# Patient Record
Sex: MALE | Race: OTHER
[De-identification: names, ages, dates, MRNs, and addresses within clinical notes are randomized per-mention and may not be internally consistent; named-entity substitution may affect disease eponyms.]

---

## 2020-06-16 ENCOUNTER — HOSPITAL ENCOUNTER (INPATIENT)
Dept: HOSPITAL 72 - SDSOVERFLO | Age: 60
LOS: 2 days | Discharge: HOME HEALTH | DRG: 470 | End: 2020-06-18
Payer: COMMERCIAL

## 2020-06-16 VITALS — DIASTOLIC BLOOD PRESSURE: 77 MMHG | SYSTOLIC BLOOD PRESSURE: 127 MMHG

## 2020-06-16 VITALS — DIASTOLIC BLOOD PRESSURE: 79 MMHG | SYSTOLIC BLOOD PRESSURE: 124 MMHG

## 2020-06-16 VITALS — DIASTOLIC BLOOD PRESSURE: 70 MMHG | SYSTOLIC BLOOD PRESSURE: 124 MMHG

## 2020-06-16 VITALS — DIASTOLIC BLOOD PRESSURE: 66 MMHG | SYSTOLIC BLOOD PRESSURE: 111 MMHG

## 2020-06-16 VITALS — DIASTOLIC BLOOD PRESSURE: 88 MMHG | SYSTOLIC BLOOD PRESSURE: 131 MMHG

## 2020-06-16 VITALS — SYSTOLIC BLOOD PRESSURE: 148 MMHG | DIASTOLIC BLOOD PRESSURE: 75 MMHG

## 2020-06-16 VITALS — BODY MASS INDEX: 31.76 KG/M2 | HEIGHT: 64 IN | WEIGHT: 186 LBS

## 2020-06-16 VITALS — SYSTOLIC BLOOD PRESSURE: 117 MMHG | DIASTOLIC BLOOD PRESSURE: 71 MMHG

## 2020-06-16 VITALS — DIASTOLIC BLOOD PRESSURE: 75 MMHG | SYSTOLIC BLOOD PRESSURE: 131 MMHG

## 2020-06-16 VITALS — DIASTOLIC BLOOD PRESSURE: 74 MMHG | SYSTOLIC BLOOD PRESSURE: 123 MMHG

## 2020-06-16 VITALS — SYSTOLIC BLOOD PRESSURE: 134 MMHG | DIASTOLIC BLOOD PRESSURE: 88 MMHG

## 2020-06-16 VITALS — SYSTOLIC BLOOD PRESSURE: 127 MMHG | DIASTOLIC BLOOD PRESSURE: 70 MMHG

## 2020-06-16 VITALS — SYSTOLIC BLOOD PRESSURE: 127 MMHG | DIASTOLIC BLOOD PRESSURE: 76 MMHG

## 2020-06-16 VITALS — DIASTOLIC BLOOD PRESSURE: 70 MMHG | SYSTOLIC BLOOD PRESSURE: 135 MMHG

## 2020-06-16 VITALS — SYSTOLIC BLOOD PRESSURE: 127 MMHG | DIASTOLIC BLOOD PRESSURE: 79 MMHG

## 2020-06-16 DIAGNOSIS — I10: ICD-10-CM

## 2020-06-16 DIAGNOSIS — Z87.891: ICD-10-CM

## 2020-06-16 DIAGNOSIS — Z79.82: ICD-10-CM

## 2020-06-16 DIAGNOSIS — E78.5: ICD-10-CM

## 2020-06-16 DIAGNOSIS — Z79.84: ICD-10-CM

## 2020-06-16 DIAGNOSIS — Z88.0: ICD-10-CM

## 2020-06-16 DIAGNOSIS — E11.9: ICD-10-CM

## 2020-06-16 DIAGNOSIS — G62.9: ICD-10-CM

## 2020-06-16 DIAGNOSIS — M17.12: Primary | ICD-10-CM

## 2020-06-16 PROCEDURE — 86850 RBC ANTIBODY SCREEN: CPT

## 2020-06-16 PROCEDURE — 94003 VENT MGMT INPAT SUBQ DAY: CPT

## 2020-06-16 PROCEDURE — 87081 CULTURE SCREEN ONLY: CPT

## 2020-06-16 PROCEDURE — 86901 BLOOD TYPING SEROLOGIC RH(D): CPT

## 2020-06-16 PROCEDURE — 94150 VITAL CAPACITY TEST: CPT

## 2020-06-16 PROCEDURE — 86900 BLOOD TYPING SEROLOGIC ABO: CPT

## 2020-06-16 PROCEDURE — 85025 COMPLETE CBC W/AUTO DIFF WBC: CPT

## 2020-06-16 PROCEDURE — 0YPB0YZ REMOVAL OF OTHER DEVICE FROM LEFT LOWER EXTREMITY, OPEN APPROACH: ICD-10-PCS

## 2020-06-16 PROCEDURE — 36415 COLL VENOUS BLD VENIPUNCTURE: CPT

## 2020-06-16 PROCEDURE — 0SRD0J9 REPLACEMENT OF LEFT KNEE JOINT WITH SYNTHETIC SUBSTITUTE, CEMENTED, OPEN APPROACH: ICD-10-PCS

## 2020-06-16 PROCEDURE — 76000 FLUOROSCOPY <1 HR PHYS/QHP: CPT

## 2020-06-16 RX ADMIN — HYDROCODONE BITARTRATE AND ACETAMINOPHEN PRN TAB: 5; 325 TABLET ORAL at 17:31

## 2020-06-16 RX ADMIN — HYDROCODONE BITARTRATE AND ACETAMINOPHEN PRN TAB: 5; 325 TABLET ORAL at 23:51

## 2020-06-16 RX ADMIN — DOCUSATE SODIUM SCH MG: 100 CAPSULE, LIQUID FILLED ORAL at 13:00

## 2020-06-16 RX ADMIN — CLINDAMYCIN PHOSPHATE SCH MLS/HR: 12 INJECTION, SOLUTION INTRAVENOUS at 14:03

## 2020-06-16 RX ADMIN — DOCUSATE SODIUM SCH MG: 100 CAPSULE, LIQUID FILLED ORAL at 17:07

## 2020-06-16 RX ADMIN — OXYCODONE HYDROCHLORIDE SCH MG: 10 TABLET, FILM COATED, EXTENDED RELEASE ORAL at 20:10

## 2020-06-16 RX ADMIN — CELECOXIB SCH MG: 200 CAPSULE ORAL at 09:00

## 2020-06-16 RX ADMIN — DOCUSATE SODIUM SCH MG: 100 CAPSULE, LIQUID FILLED ORAL at 14:03

## 2020-06-16 RX ADMIN — OXYCODONE HYDROCHLORIDE SCH MG: 10 TABLET, FILM COATED, EXTENDED RELEASE ORAL at 09:00

## 2020-06-16 RX ADMIN — CLINDAMYCIN PHOSPHATE SCH MLS/HR: 12 INJECTION, SOLUTION INTRAVENOUS at 20:09

## 2020-06-16 RX ADMIN — DEXTROSE, SODIUM CHLORIDE, AND POTASSIUM CHLORIDE SCH MLS/HR: 5; .45; .15 INJECTION INTRAVENOUS at 12:43

## 2020-06-16 NOTE — 48 HOUR POST ANESTHESIA EVAL
Post Anesthesia Evaluation


Procedure:   L Knee Total Arthroplasty


Date of Evaluation:  Jun 16, 2020


Time of Evaluation:  12:13


Blood Pressure Systolic:  128


0:  75


Pulse Rate:  62


Respiratory Rate:  18


Temperature (Fahrenheit):  98.4


O2 Sat by Pulse Oximetry:  99


Airway:  patent


Nausea:  No


Vomiting:  No


Pain Intensity:  2


Hydration Status:  adequate


Cardiopulmonary Status:


Stable


Mental Status/LOC:  patient returned to baseline


Follow-up Care/Observations:


0


Post-Anesthesia Complications:


0


Follow-up care needed:  ready to discharge











Thien Tyler MD Jun 16, 2020 08:05

## 2020-06-16 NOTE — PRE-PROCEDURE NOTE/ATTESTATION
Pre-Procedure Note/Attestation


Complete Prior to Procedure


Planned Procedure:  left


Procedure Narrative:


left knee hardware removal and TKA





Indications for Procedure


Pre-Operative Diagnosis:


left knee arthritis





Attestation


I attest that I discussed the nature of the procedure; its benefits; risks and 

complications; and alternatives (and the risks and benefits of such alternatives

), prior to the procedure, with the patient (or the patient's legal 

representative).





I attest that, if there was a reasonable possibility of needing a blood 

transfusion, the patient (or the patient's legal representative) was given the 

[facility] standardized written summary, pursuant 

to the Matt Kimi Blood Safety Act ([place] Health and Safety Code # 1645, as 

amended).





I attest that I re-evaluated the patient just prior to the surgery and that 

there has been no change in the patient's H&P, except as documented below: NONE











Rahat Wilson MD Jun 16, 2020 06:58

## 2020-06-16 NOTE — DIAGNOSTIC IMAGING REPORT
EXAM: X-RAY XRAY Knee 1-2v L

 

CLINICAL HISTORY: Knee pain. Status post knee replacement.

 

COMPARISON:  None

 

FINDINGS:  

 

2 views are obtained demonstrating postoperative left knee replacement. Total knee

prosthesis is in place in good alignment. Old fracture deformity of the proximal

tibia noted. There are ghost tracks identified in the distal femur. No acute bony

abnormality seen. Small amounts of subcutaneous emphysema noted.

 

 

IMPRESSION:

 

Post operative left knee replacement in anatomic alignment.

## 2020-06-16 NOTE — DIAGNOSTIC IMAGING REPORT
XRAY Knee 1-2v L

 

CLINICAL HISTORY: Knee pain. Internal fixation hardware.

 

COMPARISON:  None

 

FINDINGS:  

 

Fluoroscopy independent procedure performed for localization for removal of screws.

6.3 seconds of fluoroscopy time utilized by the ordering physician.  Total cumulative

dose is 0.32 mGy and 0.21814 Gy.cm2.  Total of 3 spot images are obtained .

 

 

IMPRESSION:

 

FLUOROSCOPY GUIDED PROCEDURE.

## 2020-06-16 NOTE — BRIEF OPERATIVE NOTE
Immediate Post Operative Note


Operative Note


Chief Complaint:  left knee arthritis


Pre-op Diagnosis:


left knee arthritis


Procedure:


left knee screw removal and TKA


Post-op Diagnosis:  same as pre-op


Findings:  consistent w/pre-op dx studies


Surgeon:  md sebastien


Assistant:  sejal forde


Anesthesiologist:  md aba


Anesthesia:  general


Specimen:  yes


Complications:  none


Condition:  stable


Fluids:  ns


Estimated Blood Loss:  minimal


Drains:  none


Implant(s) used?:  Yes - Jacquelyn Martinez Jun 16, 2020 09:40

## 2020-06-16 NOTE — ANETHESIA PREOPERATIVE EVAL
Anesthesia Pre-op PMH/ROS


General


Date of Evaluation:  2020


Time of Evaluation:  06:49


Anesthesiologist:  Nayeli


ASA Score:  ASA 3


Mallampati Score


Class I : Soft palate, uvula, fauces, pillars visible


Class II: Soft palate, uvula, fauces visible


Class III: Soft palate, base of uvula visible


Class IV: Only hard plate visible


Mallampati Classification:  Class II


Surgeon:  Steve


Diagnosis:  L Knee Pain


Surgical Procedure:  L Knee Total Arthroplasty


Anesthesia History:  none


Family History:  no anesthesia problems


Allergies:  


Coded Allergies:  


     PENICILLINS (Verified  Allergy, Severe, swelling.rash, 20)


Medications:  see eMAR


Patient NPO?:  Yes





Past Medical History


Cardiovascular:  Reports: HTN, other - HL


Neurologic/Psychiatric:  Reports: depression/anxiety


Endocrine:  Reports: DM


Other:  obesity - BMI 33





Anesthesia Pre-op Phys. Exam


Physician Exam





Last Vital Signs








  Date Time  Temp Pulse Resp B/P (MAP) Pulse Ox O2 Delivery O2 Flow Rate FiO2


 


20 06:01      Room Air  


 


20 05:55 97.6 59 20 111/66 (81) 100   








Constitutional:  NAD


Neurologic:  CN 2-12 intact


Cardiovascular:  RRR


Respiratory:  CTA


Gastrointestinal:  S/NT/ND





Airway Exam


Mallampati Score:  Class II


MO:  full


ROM:  limited


Teeth:  missing, intact





Anesthesia Pre-op A/P


Risk Assessment & Plan


Assessment:


ASA 3


Plan:


GA, SED


Status Change Before Surgery:  No





Pre-Antibiotics


Dru Gram Ancef IV


Given Within 1 Hr of Incision:  Yes


Time Given:  07:11











Thien Tyler MD 2020 06:40

## 2020-06-16 NOTE — OPERATIVE NOTE - DICTATED
DATE OF OPERATION:  06/16/2020

PREOPERATIVE DIAGNOSES:

1. Left knee retained femoral screws, locking the intramedullary femoral

guide.

2. Left knee end-stage arthritis.



POSTOPERATIVE DIAGNOSES:

1. Left knee retained femoral screws, locking the intramedullary femoral

guide.

2. Left knee end-stage arthritis.



PROCEDURE:

1. Left femur removal of hardware through a separate and distinct

incision over the lateral femur.

2. Left knee total knee arthroplasty using [employer] Triathlon System,

size 5 femur, size 4 tibia, size 11 mm ultra-crosslinked polyethylene, and

33 mm all poly symmetrical patella.



SURGEON:  Rahat Wilson MD.



ASSISTANT:  Jacquelyn Rosas PA-C.



Assistant was present during the actual operative portion of the case

and was important and essential part of the operation.  During the

operation, the assistant held and operated the arthroscopic camera for

visualization, assisted by manipulating the arm to help with

visualization, and helped with essential parts of the repair process as

necessary such as operating surgical instruments under surgeon

supervision, suture management, and wound closures.



ANESTHESIOLOGIST:  Thien Tyler MD.



ANESTHESIA:  Spinal anesthesia combined with general.



ESTIMATED BLOOD LOSS:  Less than 50 mL.



TOURNIQUET TIME:  80 minutes.



COMPLICATIONS:  None.



BRIEF HISTORY:  The patient is a very pleasant 59-year-old gentleman, who

has had ongoing left knee pain, decreased range of motion, and stiffness.

He failed nonoperative treatment.  After full discussion of risks and

benefits of surgery and complications associated with it including

infection, bleeding, neurovascular complication, possibility of continued

pain, possibility of stiffness, possibility of loss of motion, and other

complications that may arise, he opted for total knee arthroplasty.  He

understood that we would have to make a separate incision to bring off the

femoral screws prior to surgery in order to accomplish the total knee

arthroplasty.  Possibility of DVT, PE, and other medical complications

were discussed with him and he understood and agreed to proceed with

surgical intervention.



OPERATIVE PROCEDURE:  The patient was brought to the operating table and

was placed supine.  All pressure points were well padded.  Spinal

anesthesia was induced and general LMA anesthesia was induced by the

anesthesiologist.  The left leg was prepped and draped in usual sterile

fashion.  The left leg was exsanguinated.  Tourniquet was inflated to 200

mmHg.  Image intensifier was brought in and the femoral hardware was

identified.  A 2.5 cm lateral incision was made and tensor fascia was

opened.  The vastus lateralis was retracted anteriorly and the hardware

was identified.  The hardware was removed including two screws and two

washers.  Once this was completed, the tensor fascia was closed using #1

Vicryl suture, subcutaneous tissue was closed using 2-0 Vicryl suture, and

skin was closed using 3-0 Monocryl suture.  After that, the wound was

thoroughly irrigated.  Once this was completed, care was given to the

total knee arthroplasty.



At this point, gowns and gloves were changed.  A standard anterior

approach to the knee was undertaken.  The incision was taken through

subcutaneous tissue.  Medial parapatellar arthrotomy was performed and

dissection was performed all the way down to the tibial tubercle.  It

should be noted that there was a previous tibial tubercle osteotomy and

there was extensive scar tissue in this area.  Extensive releases were

performed and patella could be everted and knee was flexed.  There was no

need for quadriceps nick.  Once this was completed, ACL and PCL were

resected.  The intramedullary access

into the femur was obtained and an intramedullary guide was placed into

the femur.  A 5-degree distal valgus cut was performed without any

complications.  Measurements were made and size 5 femur appeared to be the

right size.  The cutting block was placed in 3 degrees of external

rotation and anterior, posterior, and chamfer cuts were performed without

any complications.  At this point, a trial 5 femur was applied.  There was

excellent fit.  This was slightly lateralized and the peg holes were

drilled.  At this point, care was given to the tibial side.



The extramedullary tibial guide was used.  The anterior crest of the

tibia was used as the guide; this was marked.  The extramedullary guide

was then aligned with respect to slope and anatomical axis.  Approximately

2 mm was taken off the most involved side, which was the medial side.  At

this point, while protecting the posterior, medial, and lateral

structures, tibial cut was performed without any complications.  This

provided excellent cut.  Flexion-extension gap appeared to be excellent

with 11 mm of thickness.  At this point, all wounds were thoroughly

irrigated using copious amount of fluid.  The sizing of the tibia was

performed.  Size 4 appeared to be the right size.  The tibial punch was

performed.  At this point, care was given to the patella.



The patella was measured to be 22 mm in thickness.  The patella was

everted and a freehand cut was performed.  A 12 mm patella was remaining.

At this point, sizing was performed and 33 mm circumferential patella was

the right size.  The peg holes were drilled.  At this point, all trial

components including 5 femur, 4 tibia, and 33 mm patella along with an 11

mm poly were applied.  There was full extension, full flexion, and

excellent stability at 0, 30 degrees, 45 degrees, and 90 degrees of

flexion.  The range of motion was excellent.  The patellofemoral tracking

was excellent.  At this point, all wounds were thoroughly irrigated and

the trial implants were removed.  Irrigation was then continued after

trial implants were removed.  At this point, cement was mixed and tibial

component, femoral component, and patella components were all cemented and

all the excess cement was removed.  The cement was under compression while

I was curing gap.  Once the cement hardened, trialing was performed and 11

mm poly appeared to be the right size.  Therefore, 11 mm ultra

cross-linked X3 poly was then applied and locked in without any

complications.  Range of motion and stability was checked and it was

excellent as described previously.  The wounds were thoroughly irrigated

using copious amount of fluid.  The tourniquet was deflated and there was

minimal bleeding.  The small bleeders were stopped using electrocautery.

The extensor mechanism was closed using #1 Vicryl suture.  Subcutaneous

tissue was closed using 2-0 Vicryl suture.  Skin was closed using 3-0

Monocryl suture.  Sterile dressing was applied.  The patient tolerated the

procedure well without any complications and was taken to recovery room in

stable condition.  All lap counts and instrument counts were correct.









  ______________________________________________

  Rahat Wilson M.D. DR:  NATO

D:  06/16/2020 09:25

T:  06/16/2020 17:41

JOB#:  [id number]

CC:



CAS

## 2020-06-16 NOTE — IMMEDIATE POST-OP EVALUATION
Immediate Post-Op Evalulation


Immediate Post-Op Evalulation


Procedure:   L Knee Total Arthroplasty


Date of Evaluation:  2020


Time of Evaluation:  10:04


IV Fluids:  700 LR


Blood Products:  0


Estimated Blood Loss:  50


Urinary Output:  0


Blood Pressure Systolic:  131


Blood Pressure Diastolic:  88


Pulse Rate:  84


Respiratory Rate:  16


O2 Sat by Pulse Oximetry:  94


Temperature (Fahrenheit):  98.2


Pain Score (1-10):  2


Nausea:  No


Vomiting:  No


Complications


0


Patient Status:  awake, reacts, patent, extubated, none


Hydration Status:  adequate


Dru Gram Ancef IV


Given Within 1 Hr of Incision:  Yes


Time Given:  07:11











Thien Tyler MD 2020 08:05

## 2020-06-17 VITALS — SYSTOLIC BLOOD PRESSURE: 140 MMHG | DIASTOLIC BLOOD PRESSURE: 79 MMHG

## 2020-06-17 VITALS — SYSTOLIC BLOOD PRESSURE: 131 MMHG | DIASTOLIC BLOOD PRESSURE: 82 MMHG

## 2020-06-17 VITALS — SYSTOLIC BLOOD PRESSURE: 115 MMHG | DIASTOLIC BLOOD PRESSURE: 65 MMHG

## 2020-06-17 VITALS — SYSTOLIC BLOOD PRESSURE: 134 MMHG | DIASTOLIC BLOOD PRESSURE: 74 MMHG

## 2020-06-17 VITALS — DIASTOLIC BLOOD PRESSURE: 84 MMHG | SYSTOLIC BLOOD PRESSURE: 149 MMHG

## 2020-06-17 VITALS — SYSTOLIC BLOOD PRESSURE: 120 MMHG | DIASTOLIC BLOOD PRESSURE: 68 MMHG

## 2020-06-17 VITALS — DIASTOLIC BLOOD PRESSURE: 76 MMHG | SYSTOLIC BLOOD PRESSURE: 137 MMHG

## 2020-06-17 LAB
ADD MANUAL DIFF: NO
BASOPHILS NFR BLD AUTO: 0.6 % (ref 0–2)
EOSINOPHIL NFR BLD AUTO: 0.1 % (ref 0–3)
ERYTHROCYTE [DISTWIDTH] IN BLOOD BY AUTOMATED COUNT: 12.3 % (ref 11.6–14.8)
HCT VFR BLD CALC: 37.3 % (ref 42–52)
HGB BLD-MCNC: 12.2 G/DL (ref 14.2–18)
LYMPHOCYTES NFR BLD AUTO: 17 % (ref 20–45)
MCV RBC AUTO: 98 FL (ref 80–99)
MONOCYTES NFR BLD AUTO: 10.3 % (ref 1–10)
NEUTROPHILS NFR BLD AUTO: 72 % (ref 45–75)
PLATELET # BLD: 263 K/UL (ref 150–450)
RBC # BLD AUTO: 3.8 M/UL (ref 4.7–6.1)
WBC # BLD AUTO: 13.3 K/UL (ref 4.8–10.8)

## 2020-06-17 RX ADMIN — CLINDAMYCIN PHOSPHATE SCH MLS/HR: 12 INJECTION, SOLUTION INTRAVENOUS at 03:01

## 2020-06-17 RX ADMIN — DOCUSATE SODIUM SCH MG: 100 CAPSULE, LIQUID FILLED ORAL at 18:20

## 2020-06-17 RX ADMIN — DOCUSATE SODIUM SCH MG: 100 CAPSULE, LIQUID FILLED ORAL at 08:44

## 2020-06-17 RX ADMIN — CLINDAMYCIN PHOSPHATE SCH MLS/HR: 12 INJECTION, SOLUTION INTRAVENOUS at 08:44

## 2020-06-17 RX ADMIN — DOCUSATE SODIUM SCH MG: 100 CAPSULE, LIQUID FILLED ORAL at 13:58

## 2020-06-17 RX ADMIN — DEXTROSE, SODIUM CHLORIDE, AND POTASSIUM CHLORIDE SCH MLS/HR: 5; .45; .15 INJECTION INTRAVENOUS at 03:01

## 2020-06-17 RX ADMIN — OXYCODONE HYDROCHLORIDE SCH MG: 10 TABLET, FILM COATED, EXTENDED RELEASE ORAL at 21:24

## 2020-06-17 RX ADMIN — HYDROCODONE BITARTRATE AND ACETAMINOPHEN PRN TAB: 5; 325 TABLET ORAL at 18:26

## 2020-06-17 RX ADMIN — CELECOXIB SCH MG: 200 CAPSULE ORAL at 08:44

## 2020-06-17 RX ADMIN — OXYCODONE HYDROCHLORIDE SCH MG: 10 TABLET, FILM COATED, EXTENDED RELEASE ORAL at 08:45

## 2020-06-17 NOTE — ORTHOPEDIC PROGRESS NOTE
Orthopedic - Progress Note


Subjective


Symptoms:  improved





Objective





Laboratory Tests








Test


  6/17/20


05:10


 


White Blood Count


  13.3 K/UL


(4.8-10.8)  H


 


Red Blood Count


  3.80 M/UL


(4.70-6.10)  L


 


Hemoglobin


  12.2 G/DL


(14.2-18.0)  L


 


Hematocrit


  37.3 %


(42.0-52.0)  L


 


Mean Corpuscular Volume 98 FL (80-99)  


 


Mean Corpuscular Hemoglobin


  32.0 PG


(27.0-31.0)  H


 


Mean Corpuscular Hemoglobin


Concent 32.6 G/DL


(32.0-36.0)


 


Red Cell Distribution Width


  12.3 %


(11.6-14.8)


 


Platelet Count


  263 K/UL


(150-450)


 


Mean Platelet Volume


  7.3 FL


(6.5-10.1)


 


Neutrophils (%) (Auto)


  72.0 %


(45.0-75.0)


 


Lymphocytes (%) (Auto)


  17.0 %


(20.0-45.0)  L


 


Monocytes (%) (Auto)


  10.3 %


(1.0-10.0)  H


 


Eosinophils (%) (Auto)


  0.1 %


(0.0-3.0)


 


Basophils (%) (Auto)


  0.6 %


(0.0-2.0)








Last 24 Hour Vital Signs








  Date Time  Temp Pulse Resp B/P (MAP) Pulse Ox O2 Delivery O2 Flow Rate FiO2


 


6/17/20 04:00 98.0 62 16 115/65 (82) 97   


 


6/17/20 00:00 98.5 83 15 131/82 (98) 97   


 


6/16/20 21:00      Room Air  


 


6/16/20 20:00 98.7 89 17 135/70 (91) 97   


 


6/16/20 16:00 99.4 79 21 124/70 (88) 94   


 


6/16/20 14:40      Room Air  


 


6/16/20 12:00 98.1 91 19 134/88 (103) 100   


 


6/16/20 10:54 97.8 91 15 127/76 97 Nasal Cannula 3 


 


6/16/20 10:50  90 12 131/75 97 Nasal Cannula 3 


 


6/16/20 10:35  90 12 127/79 96 Nasal Cannula 3 


 


6/16/20 10:25  89 13 124/79 99 Simple Mask 6 


 


6/16/20 10:20  88 15 123/74 100 Simple Mask 6 


 


6/16/20 10:10  86 10 127/77 99 Simple Mask 6 


 


6/16/20 10:00  86 10 117/71 99 Simple Mask 6 


 


6/16/20 09:55  89 9 127/70 100 Simple Mask 6 


 


6/16/20 09:54  62 18  99   


 


6/16/20 09:52  84 16  94   


 


6/16/20 09:50  90 10 148/75 93 Simple Mask 6 


 


6/16/20 09:46 98.2 86 11 131/88 92 Simple Mask 6 

















Intake and Output  


 


 6/16/20 6/17/20





 19:00 07:00


 


Intake Total 1475 ml 480 ml


 


Output Total 125 ml 


 


Balance 1350 ml 480 ml


 


  


 


Intake Oral  480 ml


 


IV Total 1475 ml 


 


Output Urine Total 75 ml 


 


Estimated Blood Loss 50 ml 


 


# Voids  5











Laboratory Tests








Test


  6/17/20


05:10


 


White Blood Count


  13.3 K/UL


(4.8-10.8)  H


 


Red Blood Count


  3.80 M/UL


(4.70-6.10)  L


 


Hemoglobin


  12.2 G/DL


(14.2-18.0)  L


 


Hematocrit


  37.3 %


(42.0-52.0)  L


 


Mean Corpuscular Volume 98 FL (80-99)  


 


Mean Corpuscular Hemoglobin


  32.0 PG


(27.0-31.0)  H


 


Mean Corpuscular Hemoglobin


Concent 32.6 G/DL


(32.0-36.0)


 


Red Cell Distribution Width


  12.3 %


(11.6-14.8)


 


Platelet Count


  263 K/UL


(150-450)


 


Mean Platelet Volume


  7.3 FL


(6.5-10.1)


 


Neutrophils (%) (Auto)


  72.0 %


(45.0-75.0)


 


Lymphocytes (%) (Auto)


  17.0 %


(20.0-45.0)  L


 


Monocytes (%) (Auto)


  10.3 %


(1.0-10.0)  H


 


Eosinophils (%) (Auto)


  0.1 %


(0.0-3.0)


 


Basophils (%) (Auto)


  0.6 %


(0.0-2.0)








Wound:  clean, dry, intact


Drains:  none


Neuro Status:  normal


Vascular Status:  normal


Additional Comments


xray reviewed. excellent





Assessment


Post-op Diagnosis


POD 1


Procedure Performed


left knee screw removal and TKA





Plan


Plan:  PT, discharge plan - possibyl home tomorrow with home care/DME. pt has f/

u apt and all home meds











Jacquelyn Rosas Jun 17, 2020 08:12

## 2020-06-18 VITALS — SYSTOLIC BLOOD PRESSURE: 135 MMHG | DIASTOLIC BLOOD PRESSURE: 80 MMHG

## 2020-06-18 VITALS — SYSTOLIC BLOOD PRESSURE: 138 MMHG | DIASTOLIC BLOOD PRESSURE: 77 MMHG

## 2020-06-18 LAB
ADD MANUAL DIFF: NO
BASOPHILS NFR BLD AUTO: 1.1 % (ref 0–2)
EOSINOPHIL NFR BLD AUTO: 2.4 % (ref 0–3)
ERYTHROCYTE [DISTWIDTH] IN BLOOD BY AUTOMATED COUNT: 12.8 % (ref 11.6–14.8)
HCT VFR BLD CALC: 36.9 % (ref 42–52)
HGB BLD-MCNC: 12.2 G/DL (ref 14.2–18)
LYMPHOCYTES NFR BLD AUTO: 30.3 % (ref 20–45)
MCV RBC AUTO: 98 FL (ref 80–99)
MONOCYTES NFR BLD AUTO: 8.6 % (ref 1–10)
NEUTROPHILS NFR BLD AUTO: 57.7 % (ref 45–75)
PLATELET # BLD: 254 K/UL (ref 150–450)
RBC # BLD AUTO: 3.77 M/UL (ref 4.7–6.1)
WBC # BLD AUTO: 11.9 K/UL (ref 4.8–10.8)

## 2020-06-18 RX ADMIN — OXYCODONE HYDROCHLORIDE SCH MG: 10 TABLET, FILM COATED, EXTENDED RELEASE ORAL at 08:19

## 2020-06-18 RX ADMIN — CELECOXIB SCH MG: 200 CAPSULE ORAL at 08:18

## 2020-06-18 RX ADMIN — DOCUSATE SODIUM SCH MG: 100 CAPSULE, LIQUID FILLED ORAL at 08:19

## 2020-06-18 NOTE — ORTHOPEDIC PROGRESS NOTE
Orthopedic - Progress Note


Subjective


Symptoms:  c/o post-op knee pain





Objective





Last 24 Hour Vital Signs








  Date Time  Temp Pulse Resp B/P (MAP) Pulse Ox O2 Delivery O2 Flow Rate FiO2


 


6/18/20 04:00 98.8 65 16 135/80 (98) 97   


 


6/17/20 23:49 98.5 68 15 137/76 (96) 96   


 


6/17/20 21:00      Room Air  


 


6/17/20 20:00 98.2 71 20 149/84 (105) 97   


 


6/17/20 18:56 98.7       


 


6/17/20 16:00 98.7 59 20 140/79 (99) 95   


 


6/17/20 12:00 99.0 62 18 134/74 (94) 96   


 


6/17/20 09:51 98.0       


 


6/17/20 09:00      Room Air  


 


6/17/20 08:00 98.1 58 20 120/68 (85) 95   

















Intake and Output  


 


 6/17/20 6/18/20





 19:00 07:00


 


Intake Total 400 ml 240 ml


 


Output Total 800 ml 


 


Balance -400 ml 240 ml


 


  


 


Intake Oral 400 ml 240 ml


 


Output Urine Total 800 ml 


 


# Voids  3











Laboratory Tests








Test


  6/18/20


04:50


 


White Blood Count


  11.9 K/UL


(4.8-10.8)  H


 


Red Blood Count


  3.77 M/UL


(4.70-6.10)  L


 


Hemoglobin


  12.2 G/DL


(14.2-18.0)  L


 


Hematocrit


  36.9 %


(42.0-52.0)  L


 


Mean Corpuscular Volume 98 FL (80-99)  


 


Mean Corpuscular Hemoglobin


  32.3 PG


(27.0-31.0)  H


 


Mean Corpuscular Hemoglobin


Concent 33.0 G/DL


(32.0-36.0)


 


Red Cell Distribution Width


  12.8 %


(11.6-14.8)


 


Platelet Count


  254 K/UL


(150-450)


 


Mean Platelet Volume


  7.2 FL


(6.5-10.1)


 


Neutrophils (%) (Auto)


  57.7 %


(45.0-75.0)


 


Lymphocytes (%) (Auto)


  30.3 %


(20.0-45.0)


 


Monocytes (%) (Auto)


  8.6 %


(1.0-10.0)


 


Eosinophils (%) (Auto)


  2.4 %


(0.0-3.0)


 


Basophils (%) (Auto)


  1.1 %


(0.0-2.0)








Wound:  clean, intact


Drains:  none


Neuro Status:  normal


Vascular Status:  normal





Assessment


Post-op Diagnosis


POD#2 doing well


PT/home with home health today


F/u in 10 days.


Dressing change today











Rahat Wilson MD Jun 18, 2020 07:43

## 2020-06-21 NOTE — DISCHARGE SUMMARY
Discharge Summary


Hospital Course


Date of Admission


Jun 16, 2020 at 05:17


Date of Discharge


Jun 18, 2020 at 10:35


Admitting Diagnosis


 Left knee end-stage arthritis.





Reason for Hospitalization:  elective surgery


ANGÉLICA Mcnamara is a 59 year old male who was admitted on Jun 16, 2020 

at 05:17 for  Left knee end-stage arthritis.


Patient was admitted for elective surgery.





Procedures


s/p 6/16/2020 by Dr. Rahat Wilson MD.


1. Left femur removal of hardware through a separate and distinct


incision over the lateral femur.


2. Left knee total knee arthroplasty using [employer] Triathlon System,


size 5 femur, size 4 tibia, size 11 mm ultra-crosslinked polyethylene, and


33 mm all poly symmetrical patella.








Hospital Course


status post surgery 


course of recovery uneventful 


initially IV fluids


s/p perioperative antibiotics


neurovascular status  closely monitored,  remained stable 


incision clean dry and intact  with dressing, dressing changed prior to 

discharge 


pain management was addressed ; and pain was controlled


remained hemodynamically stable 


ambulated with PT  


fall precautions maintained;  safe for ambulation 


DVT prophylaxis provided 


use of incentive spirometry was  encouraged while in the bed 


tolerated diet , IV fluids discontinued 


GI prophylaxis provided 


antiemetics were on board as needed 


home medications continued 


voided freely


bowel regimen instituted 


patient was stable for discharge 





discharge instructions provided


patient was provided with raised toilet seat and front wheel walker


prescription  sent to pharmacy  


patient to follow-up with surgeon as outpatient in 10 days as advised


patient was discharged home with home health services


 














FINAL DIAGNOSES


1. Left knee retained femoral screws, locking the intramedullary femoral


guide.


2. Left knee end-stage arthritis.


3. s/p  L Knee Total Arthroplasty








Discharge Medications


Continued Medications:  


Amlodipine Besylate* (Amlodipine Besylate*) 10 Mg Tablet


10 MG ORAL DAILY for htn, TAB (This prescription has been renewed)





Aspirin* (Aspirin*) 81 Mg Tab.chew


81 MG ORAL DAILY for heart, TAB (This prescription has been renewed)





Atorvastatin Calcium* (Atorvastatin Calcium*) 20 Mg Tablet


20 MG ORAL BEDTIME for cholesterol, TAB (This prescription has been renewed)





Buspirone Hcl* (Buspirone Hcl*) 10 Mg Tablet


10 MG ORAL TWICE A DAY for anxiety, #60 TAB 0 Refills (This prescription has 

been renewed)





Duloxetine Hcl* (Cymbalta*) 60 Mg Capsule.dr


60 MG ORAL DAILY for depession, CAP (This prescription has been renewed)





Gabapentin* (Gabapentin*) 100 Mg Capsule


100 MG ORAL THREE TIMES A DAY for PAIN, CAP (This prescription has been renewed)





Glipizide* (Glipizide*) 5 Mg Tablet


5 MG ORAL BIDAC for dm, TAB (This prescription has been renewed)





Metformin Hcl* (Metformin Hcl*) 1,000 Mg Tablet


1000 MG ORAL DAILY for dm, TAB (This prescription has been renewed)





Mirtazapine* (Mirtazapine*) 15 Mg Tablet


15 MG ORAL BEDTIME for depression, TAB (This prescription has been renewed)





Terazosin HCl (Terazosin HCl) 2 Mg Capsule


2 MG PO QHS for htn, #10 CAP 0 Refills (This prescription has been renewed)











Discharge


Condition Upon Discharge:  stable


Discharge Vital Signs





Last Vital Signs








  Date Time  Temp Pulse Resp B/P (MAP) Pulse Ox O2 Delivery O2 Flow Rate FiO2


 


6/18/20 09:00      Room Air  


 


6/18/20 08:00 99.7 75 18 138/77 (97) 96   


 


6/16/20 10:54       3 








Discharge Disposition


Patient was discharged to Home (01)





Discharge Instructions


Discharge Instructions


Special Instructions


I have been assigned to complete a D/C Summary on this account. I was not 

involved in the patient management











Marcelle Cardona NP Jun 21, 2020 10:23

## 2020-06-29 NOTE — DIAGNOSTIC IMAGING REPORT
XRAY Knee 1-2v L

 

CLINICAL HISTORY: Knee pain. Internal fixation hardware.

 

COMPARISON:  None

 

FINDINGS:  

 

Fluoroscopy independent procedure performed for localization for removal of screws.

6.3 seconds of fluoroscopy time utilized by the ordering physician.  Total cumulative

dose is 0.32 mGy and 0.85919 Gy.cm2.  Total of 3 spot images are obtained .

 

 

IMPRESSION:

 

FLUOROSCOPY GUIDED PROCEDURE.